# Patient Record
Sex: FEMALE | Race: WHITE | Employment: OTHER | URBAN - METROPOLITAN AREA
[De-identification: names, ages, dates, MRNs, and addresses within clinical notes are randomized per-mention and may not be internally consistent; named-entity substitution may affect disease eponyms.]

---

## 2017-05-11 ENCOUNTER — APPOINTMENT (OUTPATIENT)
Dept: GENERAL RADIOLOGY | Age: 77
End: 2017-05-11
Attending: NURSE PRACTITIONER

## 2017-05-11 ENCOUNTER — HOSPITAL ENCOUNTER (OUTPATIENT)
Age: 77
Discharge: HOME | End: 2017-05-22
Attending: INTERNAL MEDICINE | Admitting: INTERNAL MEDICINE

## 2017-05-11 PROCEDURE — 87106 FUNGI IDENTIFICATION YEAST: CPT | Performed by: NURSE PRACTITIONER

## 2017-05-11 PROCEDURE — 87070 CULTURE OTHR SPECIMN AEROBIC: CPT | Performed by: NURSE PRACTITIONER

## 2017-05-11 PROCEDURE — 71010 XR CHEST PORT: CPT

## 2017-05-12 LAB
ALBUMIN SERPL BCP-MCNC: 1.9 G/DL (ref 3.2–4.6)
ALBUMIN/GLOB SERPL: 0.5 {RATIO} (ref 1.2–3.5)
ALP SERPL-CCNC: 80 U/L (ref 50–136)
ALT SERPL-CCNC: 17 U/L (ref 12–65)
ANION GAP BLD CALC-SCNC: 6 MMOL/L (ref 7–16)
AST SERPL W P-5'-P-CCNC: 19 U/L (ref 15–37)
BASOPHILS # BLD AUTO: 0 K/UL (ref 0–0.2)
BASOPHILS # BLD: 0 % (ref 0–2)
BILIRUB SERPL-MCNC: 0.2 MG/DL (ref 0.2–1.1)
BUN SERPL-MCNC: 22 MG/DL (ref 8–23)
CALCIUM SERPL-MCNC: 7.9 MG/DL (ref 8.3–10.4)
CHLORIDE SERPL-SCNC: 101 MMOL/L (ref 98–107)
CO2 SERPL-SCNC: 35 MMOL/L (ref 21–32)
CREAT SERPL-MCNC: 0.45 MG/DL (ref 0.6–1)
DIFFERENTIAL METHOD BLD: ABNORMAL
EOSINOPHIL # BLD: 0 K/UL (ref 0–0.8)
EOSINOPHIL NFR BLD: 0 % (ref 0.5–7.8)
ERYTHROCYTE [DISTWIDTH] IN BLOOD BY AUTOMATED COUNT: 15.5 % (ref 11.9–14.6)
GLOBULIN SER CALC-MCNC: 4.1 G/DL (ref 2.3–3.5)
GLUCOSE SERPL-MCNC: 104 MG/DL (ref 65–100)
HCT VFR BLD AUTO: 37.6 % (ref 35.8–46.3)
HGB BLD-MCNC: 12 G/DL (ref 11.7–15.4)
IMM GRANULOCYTES # BLD: 0.1 K/UL (ref 0–0.5)
IMM GRANULOCYTES NFR BLD AUTO: 0.3 % (ref 0–5)
LYMPHOCYTES # BLD AUTO: 2 % (ref 13–44)
LYMPHOCYTES # BLD: 0.4 K/UL (ref 0.5–4.6)
MAGNESIUM SERPL-MCNC: 1.9 MG/DL (ref 1.8–2.4)
MCH RBC QN AUTO: 26.3 PG (ref 26.1–32.9)
MCHC RBC AUTO-ENTMCNC: 31.9 G/DL (ref 31.4–35)
MCV RBC AUTO: 82.3 FL (ref 79.6–97.8)
MONOCYTES # BLD: 0.6 K/UL (ref 0.1–1.3)
MONOCYTES NFR BLD AUTO: 3 % (ref 4–12)
NEUTS SEG # BLD: 18.7 K/UL (ref 1.7–8.2)
NEUTS SEG NFR BLD AUTO: 95 % (ref 43–78)
PHOSPHATE SERPL-MCNC: 2.9 MG/DL (ref 2.3–3.7)
PLATELET # BLD AUTO: 283 K/UL (ref 150–450)
PMV BLD AUTO: 11.3 FL (ref 10.8–14.1)
POTASSIUM SERPL-SCNC: 3.6 MMOL/L (ref 3.5–5.1)
PROT SERPL-MCNC: 6 G/DL (ref 6.3–8.2)
RBC # BLD AUTO: 4.57 M/UL (ref 4.05–5.25)
SODIUM SERPL-SCNC: 142 MMOL/L (ref 136–145)
WBC # BLD AUTO: 19.9 K/UL (ref 4.3–11.1)

## 2017-05-12 PROCEDURE — 84100 ASSAY OF PHOSPHORUS: CPT | Performed by: NURSE PRACTITIONER

## 2017-05-12 PROCEDURE — 83735 ASSAY OF MAGNESIUM: CPT | Performed by: NURSE PRACTITIONER

## 2017-05-12 PROCEDURE — 80053 COMPREHEN METABOLIC PANEL: CPT | Performed by: NURSE PRACTITIONER

## 2017-05-12 PROCEDURE — 85025 COMPLETE CBC W/AUTO DIFF WBC: CPT | Performed by: NURSE PRACTITIONER

## 2017-05-15 LAB
ANION GAP BLD CALC-SCNC: 8 MMOL/L (ref 7–16)
BACTERIA SPEC CULT: ABNORMAL
BACTERIA SPEC CULT: ABNORMAL
BASOPHILS # BLD AUTO: 0 K/UL (ref 0–0.2)
BASOPHILS # BLD: 0 % (ref 0–2)
BNP SERPL-MCNC: 203 PG/ML
BUN SERPL-MCNC: 19 MG/DL (ref 8–23)
CALCIUM SERPL-MCNC: 8.4 MG/DL (ref 8.3–10.4)
CHLORIDE SERPL-SCNC: 95 MMOL/L (ref 98–107)
CO2 SERPL-SCNC: 35 MMOL/L (ref 21–32)
CREAT SERPL-MCNC: 0.31 MG/DL (ref 0.6–1)
DIFFERENTIAL METHOD BLD: ABNORMAL
EOSINOPHIL # BLD: 0 K/UL (ref 0–0.8)
EOSINOPHIL NFR BLD: 0 % (ref 0.5–7.8)
ERYTHROCYTE [DISTWIDTH] IN BLOOD BY AUTOMATED COUNT: 15.4 % (ref 11.9–14.6)
GLUCOSE SERPL-MCNC: 80 MG/DL (ref 65–100)
GRAM STN SPEC: ABNORMAL
HCT VFR BLD AUTO: 43.8 % (ref 35.8–46.3)
HGB BLD-MCNC: 13.7 G/DL (ref 11.7–15.4)
IMM GRANULOCYTES # BLD: 0 K/UL (ref 0–0.5)
IMM GRANULOCYTES NFR BLD AUTO: 0.3 % (ref 0–5)
LYMPHOCYTES # BLD AUTO: 9 % (ref 13–44)
LYMPHOCYTES # BLD: 1.1 K/UL (ref 0.5–4.6)
MAGNESIUM SERPL-MCNC: 2 MG/DL (ref 1.8–2.4)
MCH RBC QN AUTO: 25.9 PG (ref 26.1–32.9)
MCHC RBC AUTO-ENTMCNC: 31.3 G/DL (ref 31.4–35)
MCV RBC AUTO: 83 FL (ref 79.6–97.8)
MONOCYTES # BLD: 0.8 K/UL (ref 0.1–1.3)
MONOCYTES NFR BLD AUTO: 7 % (ref 4–12)
NEUTS SEG # BLD: 9.7 K/UL (ref 1.7–8.2)
NEUTS SEG NFR BLD AUTO: 84 % (ref 43–78)
PLATELET # BLD AUTO: 356 K/UL (ref 150–450)
PMV BLD AUTO: 11.2 FL (ref 10.8–14.1)
POTASSIUM SERPL-SCNC: 4.3 MMOL/L (ref 3.5–5.1)
RBC # BLD AUTO: 5.28 M/UL (ref 4.05–5.25)
SERVICE CMNT-IMP: ABNORMAL
SODIUM SERPL-SCNC: 138 MMOL/L (ref 136–145)
WBC # BLD AUTO: 11.6 K/UL (ref 4.3–11.1)

## 2017-05-15 PROCEDURE — 83880 ASSAY OF NATRIURETIC PEPTIDE: CPT | Performed by: NURSE PRACTITIONER

## 2017-05-15 PROCEDURE — 85025 COMPLETE CBC W/AUTO DIFF WBC: CPT | Performed by: NURSE PRACTITIONER

## 2017-05-15 PROCEDURE — 80048 BASIC METABOLIC PNL TOTAL CA: CPT | Performed by: NURSE PRACTITIONER

## 2017-05-15 PROCEDURE — 83735 ASSAY OF MAGNESIUM: CPT | Performed by: NURSE PRACTITIONER

## 2017-05-16 ENCOUNTER — APPOINTMENT (OUTPATIENT)
Dept: GENERAL RADIOLOGY | Age: 77
End: 2017-05-16
Attending: NURSE PRACTITIONER

## 2017-05-16 PROCEDURE — 71010 XR CHEST PORT: CPT

## 2018-01-01 ENCOUNTER — APPOINTMENT (OUTPATIENT)
Dept: CT IMAGING | Age: 78
DRG: 871 | End: 2018-01-01
Attending: INTERNAL MEDICINE
Payer: MEDICARE

## 2018-01-01 ENCOUNTER — APPOINTMENT (OUTPATIENT)
Dept: GENERAL RADIOLOGY | Age: 78
DRG: 871 | End: 2018-01-01
Attending: EMERGENCY MEDICINE
Payer: MEDICARE

## 2018-01-01 ENCOUNTER — APPOINTMENT (OUTPATIENT)
Dept: GENERAL RADIOLOGY | Age: 78
DRG: 871 | End: 2018-01-01
Attending: INTERNAL MEDICINE
Payer: MEDICARE

## 2018-01-01 ENCOUNTER — HOSPITAL ENCOUNTER (INPATIENT)
Age: 78
LOS: 1 days | DRG: 871 | End: 2018-09-30
Attending: EMERGENCY MEDICINE | Admitting: INTERNAL MEDICINE
Payer: MEDICARE

## 2018-01-01 VITALS
OXYGEN SATURATION: 58 % | BODY MASS INDEX: 15.39 KG/M2 | WEIGHT: 90.17 LBS | SYSTOLIC BLOOD PRESSURE: 47 MMHG | TEMPERATURE: 96.3 F | DIASTOLIC BLOOD PRESSURE: 30 MMHG | HEIGHT: 64 IN

## 2018-01-01 DIAGNOSIS — R65.21 SEPTIC SHOCK (HCC): ICD-10-CM

## 2018-01-01 DIAGNOSIS — N30.00 ACUTE CYSTITIS WITHOUT HEMATURIA: ICD-10-CM

## 2018-01-01 DIAGNOSIS — A41.9 SEPTIC SHOCK (HCC): ICD-10-CM

## 2018-01-01 DIAGNOSIS — Z66 DNR (DO NOT RESUSCITATE): ICD-10-CM

## 2018-01-01 DIAGNOSIS — K72.00 SHOCK LIVER: ICD-10-CM

## 2018-01-01 DIAGNOSIS — R63.4 WEIGHT LOSS: ICD-10-CM

## 2018-01-01 DIAGNOSIS — E87.1 HYPONATREMIA: ICD-10-CM

## 2018-01-01 DIAGNOSIS — J96.01 ACUTE RESPIRATORY FAILURE WITH HYPOXIA (HCC): ICD-10-CM

## 2018-01-01 DIAGNOSIS — E87.20 METABOLIC ACIDOSIS: ICD-10-CM

## 2018-01-01 DIAGNOSIS — J18.9 COMMUNITY ACQUIRED PNEUMONIA OF RIGHT LOWER LOBE OF LUNG: ICD-10-CM

## 2018-01-01 DIAGNOSIS — D72.829 LEUKOCYTOSIS, UNSPECIFIED TYPE: ICD-10-CM

## 2018-01-01 DIAGNOSIS — N17.9 ACUTE RENAL FAILURE, UNSPECIFIED ACUTE RENAL FAILURE TYPE (HCC): ICD-10-CM

## 2018-01-01 DIAGNOSIS — J43.8 OTHER EMPHYSEMA (HCC): ICD-10-CM

## 2018-01-01 DIAGNOSIS — J96.01 ACUTE RESPIRATORY FAILURE WITH HYPOXEMIA (HCC): ICD-10-CM

## 2018-01-01 LAB
ALBUMIN SERPL-MCNC: 1.7 G/DL (ref 3.2–4.6)
ALBUMIN SERPL-MCNC: 2.2 G/DL (ref 3.2–4.6)
ALBUMIN/GLOB SERPL: 0.4 {RATIO} (ref 1.2–3.5)
ALBUMIN/GLOB SERPL: 0.5 {RATIO} (ref 1.2–3.5)
ALP SERPL-CCNC: 120 U/L (ref 50–136)
ALP SERPL-CCNC: 148 U/L (ref 50–136)
ALT SERPL-CCNC: 1046 U/L (ref 12–65)
ALT SERPL-CCNC: 856 U/L (ref 12–65)
AMORPH CRY URNS QL MICRO: ABNORMAL
ANION GAP SERPL CALC-SCNC: 18 MMOL/L (ref 7–16)
ANION GAP SERPL CALC-SCNC: 21 MMOL/L (ref 7–16)
ANION GAP SERPL CALC-SCNC: 28 MMOL/L (ref 7–16)
APPEARANCE UR: ABNORMAL
ARTERIAL PATENCY WRIST A: ABNORMAL
ARTERIAL PATENCY WRIST A: POSITIVE
ARTERIAL PATENCY WRIST A: POSITIVE
AST SERPL-CCNC: 2780 U/L (ref 15–37)
AST SERPL-CCNC: 4328 U/L (ref 15–37)
ATRIAL RATE: 170 BPM
BACTERIA URNS QL MICRO: ABNORMAL /HPF
BASE DEFICIT BLDA-SCNC: 16.8 MMOL/L (ref 0–2)
BASE DEFICIT BLDA-SCNC: 17.9 MMOL/L (ref 0–2)
BASE DEFICIT BLDA-SCNC: 19.5 MMOL/L (ref 0–2)
BASE DEFICIT BLDA-SCNC: 19.5 MMOL/L (ref 0–2)
BASE DEFICIT BLDA-SCNC: 20.6 MMOL/L (ref 0–2)
BASE DEFICIT BLDA-SCNC: 21.7 MMOL/L (ref 0–2)
BASOPHILS # BLD: 0 K/UL (ref 0–0.2)
BASOPHILS # BLD: 0.1 K/UL (ref 0–0.2)
BASOPHILS NFR BLD: 0 % (ref 0–2)
BASOPHILS NFR BLD: 0 % (ref 0–2)
BDY SITE: ABNORMAL
BILIRUB SERPL-MCNC: 1.1 MG/DL (ref 0.2–1.1)
BILIRUB SERPL-MCNC: 1.4 MG/DL (ref 0.2–1.1)
BILIRUB UR QL: ABNORMAL
BUN SERPL-MCNC: 45 MG/DL (ref 8–23)
BUN SERPL-MCNC: 46 MG/DL (ref 8–23)
BUN SERPL-MCNC: 51 MG/DL (ref 8–23)
CALCIUM SERPL-MCNC: 6.1 MG/DL (ref 8.3–10.4)
CALCIUM SERPL-MCNC: 7.5 MG/DL (ref 8.3–10.4)
CALCIUM SERPL-MCNC: 8.1 MG/DL (ref 8.3–10.4)
CALCULATED P AXIS, ECG09: 88 DEGREES
CALCULATED R AXIS, ECG10: 86 DEGREES
CALCULATED T AXIS, ECG11: 73 DEGREES
CASTS URNS QL MICRO: ABNORMAL /LPF
CHLORIDE SERPL-SCNC: 101 MMOL/L (ref 98–107)
CHLORIDE SERPL-SCNC: 94 MMOL/L (ref 98–107)
CHLORIDE SERPL-SCNC: 95 MMOL/L (ref 98–107)
CK MB CFR SERPL CALC: 1.7 %
CK MB SERPL-MCNC: 8.5 NG/ML (ref 0.5–3.6)
CK SERPL-CCNC: 505 U/L (ref 21–215)
CO2 SERPL-SCNC: 15 MMOL/L (ref 21–32)
CO2 SERPL-SCNC: 16 MMOL/L (ref 21–32)
CO2 SERPL-SCNC: 20 MMOL/L (ref 21–32)
COHGB MFR BLD: 0.3 % (ref 0.5–1.5)
COHGB MFR BLD: 0.4 % (ref 0.5–1.5)
COHGB MFR BLD: 0.6 % (ref 0.5–1.5)
COHGB MFR BLD: 0.8 % (ref 0.5–1.5)
COLOR UR: ABNORMAL
CREAT SERPL-MCNC: 1.66 MG/DL (ref 0.6–1)
CREAT SERPL-MCNC: 1.66 MG/DL (ref 0.6–1)
CREAT SERPL-MCNC: 2.16 MG/DL (ref 0.6–1)
DIAGNOSIS, 93000: NORMAL
DIFFERENTIAL METHOD BLD: ABNORMAL
DO-HGB BLD-MCNC: 2 % (ref 0–5)
DO-HGB BLD-MCNC: 4 % (ref 0–5)
DO-HGB BLD-MCNC: 4 % (ref 0–5)
DO-HGB BLD-MCNC: 5 % (ref 0–5)
DO-HGB BLD-MCNC: 6 % (ref 0–5)
DO-HGB BLD-MCNC: 6 % (ref 0–5)
EOSINOPHIL # BLD: 0 K/UL (ref 0–0.8)
EOSINOPHIL # BLD: 0 K/UL (ref 0–0.8)
EOSINOPHIL NFR BLD: 0 % (ref 0.5–7.8)
EOSINOPHIL NFR BLD: 0 % (ref 0.5–7.8)
EPI CELLS #/AREA URNS HPF: ABNORMAL /HPF
ERYTHROCYTE [DISTWIDTH] IN BLOOD BY AUTOMATED COUNT: 15.5 %
ERYTHROCYTE [DISTWIDTH] IN BLOOD BY AUTOMATED COUNT: 15.8 %
ERYTHROCYTE [DISTWIDTH] IN BLOOD BY AUTOMATED COUNT: 16.9 %
FIO2 ON VENT: 50 %
FIO2 ON VENT: 60 %
GLOBULIN SER CALC-MCNC: 4.1 G/DL (ref 2.3–3.5)
GLOBULIN SER CALC-MCNC: 4.7 G/DL (ref 2.3–3.5)
GLUCOSE BLD STRIP.AUTO-MCNC: 154 MG/DL (ref 65–100)
GLUCOSE BLD STRIP.AUTO-MCNC: 154 MG/DL (ref 65–100)
GLUCOSE BLD STRIP.AUTO-MCNC: 171 MG/DL (ref 65–100)
GLUCOSE BLD STRIP.AUTO-MCNC: 216 MG/DL (ref 65–100)
GLUCOSE SERPL-MCNC: 143 MG/DL (ref 65–100)
GLUCOSE SERPL-MCNC: 202 MG/DL (ref 65–100)
GLUCOSE SERPL-MCNC: 270 MG/DL (ref 65–100)
GLUCOSE UR STRIP.AUTO-MCNC: NEGATIVE MG/DL
HCO3 BLDA-SCNC: 10 MMOL/L (ref 22–26)
HCO3 BLDA-SCNC: 11 MMOL/L (ref 22–26)
HCO3 BLDA-SCNC: 12 MMOL/L (ref 22–26)
HCT VFR BLD AUTO: 32.4 % (ref 35.8–46.3)
HCT VFR BLD AUTO: 39.7 % (ref 35.8–46.3)
HCT VFR BLD AUTO: 41.5 % (ref 35.8–46.3)
HGB BLD-MCNC: 12 G/DL (ref 11.7–15.4)
HGB BLD-MCNC: 12.1 G/DL (ref 11.7–15.4)
HGB BLD-MCNC: 9.1 G/DL (ref 11.7–15.4)
HGB BLDMV-MCNC: 11 GM/DL (ref 11.7–15)
HGB BLDMV-MCNC: 11.7 GM/DL (ref 11.7–15)
HGB BLDMV-MCNC: 12.6 GM/DL (ref 11.7–15)
HGB BLDMV-MCNC: 13 GM/DL (ref 11.7–15)
HGB BLDMV-MCNC: 8.2 GM/DL (ref 11.7–15)
HGB BLDMV-MCNC: 9.7 GM/DL (ref 11.7–15)
HGB UR QL STRIP: ABNORMAL
IMM GRANULOCYTES # BLD: 0.3 K/UL (ref 0–0.5)
IMM GRANULOCYTES # BLD: 1.1 K/UL (ref 0–0.5)
IMM GRANULOCYTES NFR BLD AUTO: 1 % (ref 0–5)
IMM GRANULOCYTES NFR BLD AUTO: 3 % (ref 0–5)
INR PPP: 10.5
KETONES UR QL STRIP.AUTO: ABNORMAL MG/DL
LACTATE BLD-SCNC: 8.9 MMOL/L (ref 0.5–1.9)
LACTATE SERPL-SCNC: 19.1 MMOL/L (ref 0.4–2)
LEUKOCYTE ESTERASE UR QL STRIP.AUTO: ABNORMAL
LYMPHOCYTES # BLD: 0.6 K/UL (ref 0.5–4.6)
LYMPHOCYTES # BLD: 0.8 K/UL (ref 0.5–4.6)
LYMPHOCYTES # BLD: 1.5 K/UL (ref 0.5–4.6)
LYMPHOCYTES NFR BLD MANUAL: 4 % (ref 16–44)
LYMPHOCYTES NFR BLD: 2 % (ref 13–44)
LYMPHOCYTES NFR BLD: 2 % (ref 13–44)
MCH RBC QN AUTO: 26.8 PG (ref 26.1–32.9)
MCH RBC QN AUTO: 27.2 PG (ref 26.1–32.9)
MCH RBC QN AUTO: 27.6 PG (ref 26.1–32.9)
MCHC RBC AUTO-ENTMCNC: 28.1 G/DL (ref 31.4–35)
MCHC RBC AUTO-ENTMCNC: 29.2 G/DL (ref 31.4–35)
MCHC RBC AUTO-ENTMCNC: 30.2 G/DL (ref 31.4–35)
MCV RBC AUTO: 88.6 FL (ref 79.6–97.8)
MCV RBC AUTO: 94.5 FL (ref 79.6–97.8)
MCV RBC AUTO: 96.7 FL (ref 79.6–97.8)
METAMYELOCYTES NFR BLD MANUAL: 1 %
METHGB MFR BLD: 0.5 % (ref 0–1.5)
METHGB MFR BLD: 0.7 % (ref 0–1.5)
METHGB MFR BLD: 0.7 % (ref 0–1.5)
METHGB MFR BLD: 0.8 % (ref 0–1.5)
METHGB MFR BLD: 0.8 % (ref 0–1.5)
METHGB MFR BLD: 0.9 % (ref 0–1.5)
MONOCYTES # BLD: 1.1 K/UL (ref 0.1–1.3)
MONOCYTES # BLD: 1.5 K/UL (ref 0.1–1.3)
MONOCYTES # BLD: 1.7 K/UL (ref 0.1–1.3)
MONOCYTES NFR BLD MANUAL: 4 % (ref 3–9)
MONOCYTES NFR BLD: 3 % (ref 4–12)
MONOCYTES NFR BLD: 6 % (ref 4–12)
NEUTS BAND NFR BLD MANUAL: 7 % (ref 0–10)
NEUTS SEG # BLD: 26.3 K/UL (ref 1.7–8.2)
NEUTS SEG # BLD: 31.5 K/UL (ref 1.7–8.2)
NEUTS SEG # BLD: 34.2 K/UL (ref 1.7–8.2)
NEUTS SEG NFR BLD MANUAL: 84 % (ref 47–75)
NEUTS SEG NFR BLD: 91 % (ref 43–78)
NEUTS SEG NFR BLD: 91 % (ref 43–78)
NITRITE UR QL STRIP.AUTO: NEGATIVE
NRBC # BLD: 0.02 K/UL (ref 0–0.2)
NRBC # BLD: 0.05 K/UL (ref 0–0.2)
NRBC # BLD: 0.25 K/UL (ref 0–0.2)
NRBC BLD-RTO: 2 PER 100 WBC
OTHER OBSERVATIONS,UCOM: ABNORMAL
OXYHGB MFR BLDA: 92.4 % (ref 94–97)
OXYHGB MFR BLDA: 93.2 % (ref 94–97)
OXYHGB MFR BLDA: 93.9 % (ref 94–97)
OXYHGB MFR BLDA: 95.2 % (ref 94–97)
OXYHGB MFR BLDA: 95.3 % (ref 94–97)
OXYHGB MFR BLDA: 97 % (ref 94–97)
PCO2 BLDA: 30 MMHG (ref 35–45)
PCO2 BLDA: 34 MMHG (ref 35–45)
PCO2 BLDA: 38 MMHG (ref 35–45)
PCO2 BLDA: 40 MMHG (ref 35–45)
PCO2 BLDA: 40 MMHG (ref 35–45)
PCO2 BLDA: 46 MMHG (ref 35–45)
PEEP RESPIRATORY: 8 CM[H2O]
PH BLDA: 6.95 [PH] (ref 7.35–7.45)
PH BLDA: 7.01 [PH] (ref 7.35–7.45)
PH BLDA: 7.03 [PH] (ref 7.35–7.45)
PH BLDA: 7.07 [PH] (ref 7.35–7.45)
PH BLDA: 7.07 [PH] (ref 7.35–7.45)
PH BLDA: 7.16 [PH] (ref 7.35–7.45)
PH UR STRIP: 5 [PH] (ref 5–9)
PLATELET # BLD AUTO: 150 K/UL (ref 150–450)
PLATELET # BLD AUTO: 238 K/UL (ref 150–450)
PLATELET # BLD AUTO: 42 K/UL (ref 150–450)
PLATELET COMMENTS,PCOM: ABNORMAL
PMV BLD AUTO: 10.3 FL (ref 9.4–12.3)
PMV BLD AUTO: 11.2 FL (ref 9.4–12.3)
PMV BLD AUTO: 11.7 FL (ref 9.4–12.3)
PO2 BLDA: 105 MMHG (ref 80–105)
PO2 BLDA: 111 MMHG (ref 80–105)
PO2 BLDA: 136 MMHG (ref 80–105)
PO2 BLDA: 136 MMHG (ref 80–105)
PO2 BLDA: 92 MMHG (ref 80–105)
PO2 BLDA: 94 MMHG (ref 80–105)
POTASSIUM SERPL-SCNC: 4.7 MMOL/L (ref 3.5–5.1)
POTASSIUM SERPL-SCNC: 4.9 MMOL/L (ref 3.5–5.1)
POTASSIUM SERPL-SCNC: 5.4 MMOL/L (ref 3.5–5.1)
PROCALCITONIN SERPL-MCNC: 1.6 NG/ML
PROT SERPL-MCNC: 5.8 G/DL (ref 6.3–8.2)
PROT SERPL-MCNC: 6.9 G/DL (ref 6.3–8.2)
PROT UR STRIP-MCNC: 100 MG/DL
PROTHROMBIN TIME: 78.3 SEC (ref 11.5–14.5)
Q-T INTERVAL, ECG07: 300 MS
QRS DURATION, ECG06: 72 MS
QTC CALCULATION (BEZET), ECG08: 482 MS
RBC # BLD AUTO: 3.35 M/UL (ref 4.05–5.2)
RBC # BLD AUTO: 4.39 M/UL (ref 4.05–5.2)
RBC # BLD AUTO: 4.48 M/UL (ref 4.05–5.2)
RBC #/AREA URNS HPF: ABNORMAL /HPF
RBC MORPH BLD: ABNORMAL
RESP RATE: 15
RESP RATE: 24
RESP RATE: 24
RESP RATE: 30
SAO2 % BLD: 94 % (ref 92–98.5)
SAO2 % BLD: 94 % (ref 92–98.5)
SAO2 % BLD: 95 % (ref 92–98.5)
SAO2 % BLD: 96 % (ref 92–98.5)
SAO2 % BLD: 97 % (ref 92–98.5)
SAO2 % BLD: 98 % (ref 92–98.5)
SERVICE CMNT-IMP: ABNORMAL
SODIUM SERPL-SCNC: 133 MMOL/L (ref 136–145)
SODIUM SERPL-SCNC: 137 MMOL/L (ref 136–145)
SODIUM SERPL-SCNC: 138 MMOL/L (ref 136–145)
SP GR UR REFRACTOMETRY: 1.02 (ref 1–1.02)
TROPONIN I BLD-MCNC: 0.35 NG/ML (ref 0.02–0.05)
TROPONIN I SERPL-MCNC: 1.93 NG/ML (ref 0.02–0.05)
UROBILINOGEN UR QL STRIP.AUTO: 2 EU/DL (ref 0.2–1)
VENTILATION MODE VENT: ABNORMAL
VENTRICULAR RATE, ECG03: 155 BPM
VT SETTING VENT: 400 ML
VT SETTING VENT: 480 ML
VT SETTING VENT: 480 ML
WBC # BLD AUTO: 28.9 K/UL (ref 4.3–11.1)
WBC # BLD AUTO: 34.7 K/UL (ref 4.3–11.1)
WBC # BLD AUTO: 37.2 K/UL (ref 4.3–11.1)
WBC MORPH BLD: ABNORMAL
WBC URNS QL MICRO: ABNORMAL /HPF

## 2018-01-01 PROCEDURE — 65610000001 HC ROOM ICU GENERAL

## 2018-01-01 PROCEDURE — 74011250636 HC RX REV CODE- 250/636: Performed by: EMERGENCY MEDICINE

## 2018-01-01 PROCEDURE — 36556 INSERT NON-TUNNEL CV CATH: CPT

## 2018-01-01 PROCEDURE — 94640 AIRWAY INHALATION TREATMENT: CPT

## 2018-01-01 PROCEDURE — 74011000250 HC RX REV CODE- 250: Performed by: INTERNAL MEDICINE

## 2018-01-01 PROCEDURE — 74011250636 HC RX REV CODE- 250/636: Performed by: INTERNAL MEDICINE

## 2018-01-01 PROCEDURE — 71045 X-RAY EXAM CHEST 1 VIEW: CPT

## 2018-01-01 PROCEDURE — 82803 BLOOD GASES ANY COMBINATION: CPT

## 2018-01-01 PROCEDURE — 87185 SC STD ENZYME DETCJ PER NZM: CPT

## 2018-01-01 PROCEDURE — 96365 THER/PROPH/DIAG IV INF INIT: CPT | Performed by: EMERGENCY MEDICINE

## 2018-01-01 PROCEDURE — 0T9B70Z DRAINAGE OF BLADDER WITH DRAINAGE DEVICE, VIA NATURAL OR ARTIFICIAL OPENING: ICD-10-PCS | Performed by: INTERNAL MEDICINE

## 2018-01-01 PROCEDURE — 94002 VENT MGMT INPAT INIT DAY: CPT

## 2018-01-01 PROCEDURE — 84484 ASSAY OF TROPONIN QUANT: CPT

## 2018-01-01 PROCEDURE — 83605 ASSAY OF LACTIC ACID: CPT

## 2018-01-01 PROCEDURE — 85025 COMPLETE CBC W/AUTO DIFF WBC: CPT

## 2018-01-01 PROCEDURE — 77030020263 HC SOL INJ SOD CL0.9% LFCR 1000ML

## 2018-01-01 PROCEDURE — C1751 CATH, INF, PER/CENT/MIDLINE: HCPCS

## 2018-01-01 PROCEDURE — 93005 ELECTROCARDIOGRAM TRACING: CPT | Performed by: EMERGENCY MEDICINE

## 2018-01-01 PROCEDURE — 74011000258 HC RX REV CODE- 258: Performed by: INTERNAL MEDICINE

## 2018-01-01 PROCEDURE — 99223 1ST HOSP IP/OBS HIGH 75: CPT | Performed by: INTERNAL MEDICINE

## 2018-01-01 PROCEDURE — 82962 GLUCOSE BLOOD TEST: CPT

## 2018-01-01 PROCEDURE — 36600 WITHDRAWAL OF ARTERIAL BLOOD: CPT

## 2018-01-01 PROCEDURE — 96375 TX/PRO/DX INJ NEW DRUG ADDON: CPT | Performed by: EMERGENCY MEDICINE

## 2018-01-01 PROCEDURE — P9047 ALBUMIN (HUMAN), 25%, 50ML: HCPCS | Performed by: INTERNAL MEDICINE

## 2018-01-01 PROCEDURE — 31500 INSERT EMERGENCY AIRWAY: CPT

## 2018-01-01 PROCEDURE — 81001 URINALYSIS AUTO W/SCOPE: CPT

## 2018-01-01 PROCEDURE — B546ZZA ULTRASONOGRAPHY OF RIGHT SUBCLAVIAN VEIN, GUIDANCE: ICD-10-PCS | Performed by: INTERNAL MEDICINE

## 2018-01-01 PROCEDURE — 74011636637 HC RX REV CODE- 636/637: Performed by: INTERNAL MEDICINE

## 2018-01-01 PROCEDURE — 77030005518 HC CATH URETH FOL 2W BARD -B

## 2018-01-01 PROCEDURE — 99285 EMERGENCY DEPT VISIT HI MDM: CPT | Performed by: EMERGENCY MEDICINE

## 2018-01-01 PROCEDURE — 85610 PROTHROMBIN TIME: CPT

## 2018-01-01 PROCEDURE — 74011000250 HC RX REV CODE- 250: Performed by: EMERGENCY MEDICINE

## 2018-01-01 PROCEDURE — 80053 COMPREHEN METABOLIC PANEL: CPT

## 2018-01-01 PROCEDURE — C9113 INJ PANTOPRAZOLE SODIUM, VIA: HCPCS | Performed by: INTERNAL MEDICINE

## 2018-01-01 PROCEDURE — 82550 ASSAY OF CK (CPK): CPT

## 2018-01-01 PROCEDURE — 36556 INSERT NON-TUNNEL CV CATH: CPT | Performed by: INTERNAL MEDICINE

## 2018-01-01 PROCEDURE — 87040 BLOOD CULTURE FOR BACTERIA: CPT

## 2018-01-01 PROCEDURE — 0BH17EZ INSERTION OF ENDOTRACHEAL AIRWAY INTO TRACHEA, VIA NATURAL OR ARTIFICIAL OPENING: ICD-10-PCS | Performed by: EMERGENCY MEDICINE

## 2018-01-01 PROCEDURE — 77030013794 HC KT TRNSDUC BLD EDWD -B

## 2018-01-01 PROCEDURE — 96367 TX/PROPH/DG ADDL SEQ IV INF: CPT | Performed by: EMERGENCY MEDICINE

## 2018-01-01 PROCEDURE — 77030008771 HC TU NG SALEM SUMP -A

## 2018-01-01 PROCEDURE — 05H533Z INSERTION OF INFUSION DEVICE INTO RIGHT SUBCLAVIAN VEIN, PERCUTANEOUS APPROACH: ICD-10-PCS | Performed by: INTERNAL MEDICINE

## 2018-01-01 PROCEDURE — 51702 INSERT TEMP BLADDER CATH: CPT | Performed by: EMERGENCY MEDICINE

## 2018-01-01 PROCEDURE — 99291 CRITICAL CARE FIRST HOUR: CPT | Performed by: INTERNAL MEDICINE

## 2018-01-01 PROCEDURE — 70450 CT HEAD/BRAIN W/O DYE: CPT

## 2018-01-01 PROCEDURE — 36415 COLL VENOUS BLD VENIPUNCTURE: CPT

## 2018-01-01 PROCEDURE — 77030019605

## 2018-01-01 PROCEDURE — 5A1945Z RESPIRATORY VENTILATION, 24-96 CONSECUTIVE HOURS: ICD-10-PCS | Performed by: EMERGENCY MEDICINE

## 2018-01-01 PROCEDURE — 84145 PROCALCITONIN (PCT): CPT

## 2018-01-01 PROCEDURE — 87070 CULTURE OTHR SPECIMN AEROBIC: CPT

## 2018-01-01 PROCEDURE — 74011000258 HC RX REV CODE- 258: Performed by: EMERGENCY MEDICINE

## 2018-01-01 RX ORDER — PHENYLEPHRINE 40 MG/250 ML (160 MCG/ML) IN 0.9 % SODIUM CHLORIDE IV
10-100 SOLUTION
Status: DISCONTINUED | OUTPATIENT
Start: 2018-01-01 | End: 2018-01-01 | Stop reason: SDUPTHER

## 2018-01-01 RX ORDER — SODIUM BICARBONATE 1 MEQ/ML
50 SYRINGE (ML) INTRAVENOUS
Status: DISCONTINUED | OUTPATIENT
Start: 2018-01-01 | End: 2018-01-01 | Stop reason: RX

## 2018-01-01 RX ORDER — SODIUM CHLORIDE 0.9 % (FLUSH) 0.9 %
5-10 SYRINGE (ML) INJECTION EVERY 8 HOURS
Status: DISCONTINUED | OUTPATIENT
Start: 2018-01-01 | End: 2018-10-01 | Stop reason: HOSPADM

## 2018-01-01 RX ORDER — HYDROCORTISONE SODIUM SUCCINATE 100 MG/2ML
50 INJECTION, POWDER, FOR SOLUTION INTRAMUSCULAR; INTRAVENOUS EVERY 8 HOURS
Status: DISCONTINUED | OUTPATIENT
Start: 2018-01-01 | End: 2018-01-01

## 2018-01-01 RX ORDER — SODIUM CHLORIDE 0.9 % (FLUSH) 0.9 %
5-10 SYRINGE (ML) INJECTION AS NEEDED
Status: DISCONTINUED | OUTPATIENT
Start: 2018-01-01 | End: 2018-10-01 | Stop reason: HOSPADM

## 2018-01-01 RX ORDER — ALBUTEROL SULFATE 0.83 MG/ML
2.5 SOLUTION RESPIRATORY (INHALATION)
Status: DISCONTINUED | OUTPATIENT
Start: 2018-01-01 | End: 2018-10-01 | Stop reason: HOSPADM

## 2018-01-01 RX ORDER — GUAIFENESIN 100 MG/5ML
81 LIQUID (ML) ORAL DAILY
COMMUNITY

## 2018-01-01 RX ORDER — NOREPINEPHRINE BIT/0.9 % NACL 4MG/250ML
2-20 PLASTIC BAG, INJECTION (ML) INTRAVENOUS
Status: DISCONTINUED | OUTPATIENT
Start: 2018-01-01 | End: 2018-10-01 | Stop reason: HOSPADM

## 2018-01-01 RX ORDER — ALBUMIN HUMAN 250 G/1000ML
25 SOLUTION INTRAVENOUS
Status: COMPLETED | OUTPATIENT
Start: 2018-01-01 | End: 2018-01-01

## 2018-01-01 RX ORDER — SODIUM CHLORIDE 9 MG/ML
50 INJECTION, SOLUTION INTRAVENOUS CONTINUOUS
Status: DISCONTINUED | OUTPATIENT
Start: 2018-01-01 | End: 2018-01-01

## 2018-01-01 RX ORDER — ASPIRIN 325 MG
325 TABLET ORAL DAILY
Status: DISCONTINUED | OUTPATIENT
Start: 2018-10-01 | End: 2018-10-01 | Stop reason: HOSPADM

## 2018-01-01 RX ORDER — SODIUM CHLORIDE 9 MG/ML
250 INJECTION, SOLUTION INTRAVENOUS AS NEEDED
Status: DISCONTINUED | OUTPATIENT
Start: 2018-01-01 | End: 2018-10-01 | Stop reason: HOSPADM

## 2018-01-01 RX ORDER — ALBUTEROL SULFATE 2.5 MG/.5ML
2.5 SOLUTION RESPIRATORY (INHALATION)
Status: COMPLETED | OUTPATIENT
Start: 2018-01-01 | End: 2018-01-01

## 2018-01-01 RX ORDER — MIRTAZAPINE 15 MG/1
15 TABLET, FILM COATED ORAL
COMMUNITY

## 2018-01-01 RX ORDER — PHENYLEPHRINE HCL IN 0.9% NACL 30MG/250ML
10-200 PLASTIC BAG, INJECTION (ML) INTRAVENOUS
Status: DISCONTINUED | OUTPATIENT
Start: 2018-01-01 | End: 2018-10-01 | Stop reason: HOSPADM

## 2018-01-01 RX ORDER — ENOXAPARIN SODIUM 100 MG/ML
30 INJECTION SUBCUTANEOUS EVERY 24 HOURS
Status: DISCONTINUED | OUTPATIENT
Start: 2018-01-01 | End: 2018-01-01

## 2018-01-01 RX ORDER — LORAZEPAM 1 MG/1
1 TABLET ORAL
COMMUNITY

## 2018-01-01 RX ADMIN — INSULIN HUMAN 4 UNITS: 100 INJECTION, SOLUTION PARENTERAL at 07:47

## 2018-01-01 RX ADMIN — SODIUM ACETATE: 3.28 INJECTION, SOLUTION, CONCENTRATE INTRAVENOUS at 23:13

## 2018-01-01 RX ADMIN — SODIUM CHLORIDE 50 ML/HR: 900 INJECTION, SOLUTION INTRAVENOUS at 02:35

## 2018-01-01 RX ADMIN — METHYLPREDNISOLONE SODIUM SUCCINATE 125 MG: 125 INJECTION, POWDER, FOR SOLUTION INTRAMUSCULAR; INTRAVENOUS at 20:13

## 2018-01-01 RX ADMIN — ALBUTEROL SULFATE 2.5 MG: 2.5 SOLUTION RESPIRATORY (INHALATION) at 20:00

## 2018-01-01 RX ADMIN — SODIUM ACETATE: 3.28 INJECTION, SOLUTION, CONCENTRATE INTRAVENOUS at 14:52

## 2018-01-01 RX ADMIN — VASOPRESSIN 0.01 UNITS/MIN: 20 INJECTION INTRAVENOUS at 12:44

## 2018-01-01 RX ADMIN — VANCOMYCIN HYDROCHLORIDE 1000 MG: 1 INJECTION, POWDER, LYOPHILIZED, FOR SOLUTION INTRAVENOUS at 18:20

## 2018-01-01 RX ADMIN — DEXTROSE MONOHYDRATE: 5 INJECTION, SOLUTION INTRAVENOUS at 20:08

## 2018-01-01 RX ADMIN — AZITHROMYCIN MONOHYDRATE 500 MG: 500 INJECTION, POWDER, LYOPHILIZED, FOR SOLUTION INTRAVENOUS at 19:03

## 2018-01-01 RX ADMIN — SODIUM CHLORIDE 224 ML: 900 INJECTION, SOLUTION INTRAVENOUS at 18:46

## 2018-01-01 RX ADMIN — NOREPINEPHRINE BITARTRATE 2 MCG/MIN: 1 INJECTION INTRAVENOUS at 20:13

## 2018-01-01 RX ADMIN — ALBUTEROL SULFATE 2.5 MG: 2.5 SOLUTION RESPIRATORY (INHALATION) at 11:18

## 2018-01-01 RX ADMIN — INSULIN HUMAN 2 UNITS: 100 INJECTION, SOLUTION PARENTERAL at 23:18

## 2018-01-01 RX ADMIN — SODIUM ACETATE 50 MEQ: 3.28 INJECTION, SOLUTION, CONCENTRATE INTRAVENOUS at 21:27

## 2018-01-01 RX ADMIN — SODIUM CHLORIDE 1000 ML: 900 INJECTION, SOLUTION INTRAVENOUS at 19:40

## 2018-01-01 RX ADMIN — SODIUM CHLORIDE 40 MG: 9 INJECTION, SOLUTION INTRAMUSCULAR; INTRAVENOUS; SUBCUTANEOUS at 17:27

## 2018-01-01 RX ADMIN — METHYLPREDNISOLONE SODIUM SUCCINATE 60 MG: 125 INJECTION, POWDER, FOR SOLUTION INTRAMUSCULAR; INTRAVENOUS at 09:25

## 2018-01-01 RX ADMIN — NOREPINEPHRINE BITARTRATE 14 MCG/MIN: 1 INJECTION INTRAVENOUS at 07:19

## 2018-01-01 RX ADMIN — SODIUM CHLORIDE 1000 ML: 900 INJECTION, SOLUTION INTRAVENOUS at 17:00

## 2018-01-01 RX ADMIN — METHYLPREDNISOLONE SODIUM SUCCINATE 60 MG: 125 INJECTION, POWDER, FOR SOLUTION INTRAMUSCULAR; INTRAVENOUS at 23:20

## 2018-01-01 RX ADMIN — SODIUM ACETATE: 3.28 INJECTION, SOLUTION, CONCENTRATE INTRAVENOUS at 08:16

## 2018-01-01 RX ADMIN — AMIODARONE HYDROCHLORIDE 150 MG: 50 INJECTION, SOLUTION INTRAVENOUS at 09:29

## 2018-01-01 RX ADMIN — NOREPINEPHRINE BITARTRATE 20 MCG/MIN: 1 INJECTION INTRAVENOUS at 15:32

## 2018-01-01 RX ADMIN — PHYTONADIONE 10 MG: 10 INJECTION, EMULSION INTRAMUSCULAR; INTRAVENOUS; SUBCUTANEOUS at 19:17

## 2018-01-01 RX ADMIN — INSULIN HUMAN 2 UNITS: 100 INJECTION, SOLUTION PARENTERAL at 13:55

## 2018-01-01 RX ADMIN — ENOXAPARIN SODIUM 30 MG: 30 INJECTION, SOLUTION INTRAVENOUS; SUBCUTANEOUS at 23:22

## 2018-01-01 RX ADMIN — NOREPINEPHRINE BITARTRATE 16 MCG/MIN: 1 INJECTION INTRAVENOUS at 11:57

## 2018-01-01 RX ADMIN — Medication 10 ML: at 23:14

## 2018-01-01 RX ADMIN — CEFTRIAXONE SODIUM 2 G: 2 INJECTION, POWDER, FOR SOLUTION INTRAMUSCULAR; INTRAVENOUS at 18:40

## 2018-01-01 RX ADMIN — ALBUTEROL SULFATE 2.5 MG: 2.5 SOLUTION RESPIRATORY (INHALATION) at 08:39

## 2018-01-01 RX ADMIN — Medication 200 MCG/MIN: at 20:17

## 2018-01-01 RX ADMIN — SODIUM CHLORIDE 1000 ML: 900 INJECTION, SOLUTION INTRAVENOUS at 18:23

## 2018-01-01 RX ADMIN — DEXTROSE MONOHYDRATE: 5 INJECTION, SOLUTION INTRAVENOUS at 15:52

## 2018-01-01 RX ADMIN — AMIODARONE HYDROCHLORIDE 1 MG/MIN: 50 INJECTION, SOLUTION INTRAVENOUS at 09:44

## 2018-01-01 RX ADMIN — NOREPINEPHRINE BITARTRATE 20 MCG/MIN: 1 INJECTION INTRAVENOUS at 19:13

## 2018-01-01 RX ADMIN — ALBUMIN (HUMAN) 25 G: 0.25 INJECTION, SOLUTION INTRAVENOUS at 18:44

## 2018-01-01 RX ADMIN — ALBUTEROL SULFATE 2.5 MG: 2.5 SOLUTION RESPIRATORY (INHALATION) at 15:36

## 2018-01-01 RX ADMIN — SODIUM CHLORIDE 5 MG/HR: 900 INJECTION, SOLUTION INTRAVENOUS at 18:12

## 2018-01-01 RX ADMIN — Medication 100 MCG/MIN: at 18:36

## 2018-01-01 RX ADMIN — Medication 10 ML: at 13:56

## 2018-01-01 RX ADMIN — NOREPINEPHRINE BITARTRATE 10 MCG/MIN: 1 INJECTION INTRAVENOUS at 06:33

## 2018-01-01 RX ADMIN — Medication 30 MCG/MIN: at 16:28

## 2018-01-01 RX ADMIN — PIPERACILLIN SODIUM,TAZOBACTAM SODIUM 3.38 G: 3; .375 INJECTION, POWDER, FOR SOLUTION INTRAVENOUS at 18:21

## 2018-09-29 PROBLEM — E87.20 METABOLIC ACIDOSIS: Status: ACTIVE | Noted: 2018-01-01

## 2018-09-29 PROBLEM — E87.1 HYPONATREMIA: Status: ACTIVE | Noted: 2018-01-01

## 2018-09-29 PROBLEM — J96.00 ACUTE RESPIRATORY FAILURE (HCC): Status: ACTIVE | Noted: 2018-01-01

## 2018-09-29 PROBLEM — K72.00 SHOCK LIVER: Status: ACTIVE | Noted: 2018-01-01

## 2018-09-29 PROBLEM — D72.829 LEUKOCYTOSIS: Status: ACTIVE | Noted: 2018-01-01

## 2018-09-29 PROBLEM — R63.4 WEIGHT LOSS: Status: ACTIVE | Noted: 2018-01-01

## 2018-09-29 PROBLEM — A41.9 SEPTIC SHOCK (HCC): Status: ACTIVE | Noted: 2018-01-01

## 2018-09-29 PROBLEM — J43.8 OTHER EMPHYSEMA (HCC): Status: ACTIVE | Noted: 2018-01-01

## 2018-09-29 PROBLEM — N17.9 ACUTE RENAL FAILURE (ARF) (HCC): Status: ACTIVE | Noted: 2018-01-01

## 2018-09-29 PROBLEM — N30.00 ACUTE CYSTITIS WITHOUT HEMATURIA: Status: ACTIVE | Noted: 2018-01-01

## 2018-09-29 PROBLEM — J96.01 ACUTE RESPIRATORY FAILURE WITH HYPOXEMIA (HCC): Status: ACTIVE | Noted: 2018-01-01

## 2018-09-29 PROBLEM — R65.21 SEPTIC SHOCK (HCC): Status: ACTIVE | Noted: 2018-01-01

## 2018-09-29 PROBLEM — Z66 DNR (DO NOT RESUSCITATE): Status: ACTIVE | Noted: 2018-01-01

## 2018-09-29 PROBLEM — J18.9 COMMUNITY ACQUIRED PNEUMONIA OF RIGHT LOWER LOBE OF LUNG: Status: ACTIVE | Noted: 2018-01-01

## 2018-09-29 NOTE — ED PROVIDER NOTES
HPI Comments: Patient arrives from [facility]) Due to the tachycardia  To be received by cardiology. Patient states she went to that facility due to generalized weakness and chest pain. Cough is productive of yellowish sputum. She is a smoker and has chronic lung disease and is oxygen dependent. Has had progressive weight loss now for at least an extended period of weeks for several months. She has lost appetite during this time as well. States that she has lost significant amount of weight unintentionally. No known malignancy baseline. Patient is a 66 y.o. female presenting with palpitations. The history is provided by the patient. Palpitations This is a new problem. The problem is associated with an unknown factor. Associated symptoms include weakness, cough and sputum production. Pertinent negatives include no fever, no chest pressure, no irregular heartbeat, no near-syncope and no syncope. Her past medical history does not include heart disease, past MI, atrial fibrillation or drug use. No past medical history on file. No past surgical history on file. No family history on file. Social History Social History  Marital status:  Spouse name: N/A  
 Number of children: N/A  
 Years of education: N/A Occupational History  Not on file. Social History Main Topics  Smoking status: Not on file  Smokeless tobacco: Not on file  Alcohol use Not on file  Drug use: Not on file  Sexual activity: Not on file Other Topics Concern  Not on file Social History Narrative ALLERGIES: Review of patient's allergies indicates no known allergies. Review of Systems Constitutional: Negative for fever. Respiratory: Positive for cough and sputum production. Cardiovascular: Positive for palpitations. Negative for syncope and near-syncope. Neurological: Positive for weakness. Vitals: 09/29/18 1759 09/29/18 1801 09/29/18 1815 BP: 107/73 107/73 100/76 Pulse: (!) 155 (!) 150 (!) 123 Resp: (!) 34 20 SpO2:  96% 96% Weight:  40.8 kg (90 lb) Height:  5' 4\" (1.626 m) Physical Exam  
Constitutional: She appears well-developed and well-nourished. No distress. Cachetic Much older than stated age HENT:  
Head: Atraumatic. Eyes: No scleral icterus. Neck: Neck supple. Cardiovascular: Regular rhythm and intact distal pulses. Tachycardia present. Pulmonary/Chest: Effort normal. No respiratory distress. Abdominal: She exhibits no distension. There is no tenderness. There is no rebound. Musculoskeletal: Normal range of motion. She exhibits no edema or deformity. Neurological: She is alert. Skin: Skin is warm and dry. She is not diaphoretic. No erythema. Psychiatric: Her behavior is normal. Thought content normal.  
Nursing note and vitals reviewed. MDM Number of Diagnoses or Management Options Diagnosis management comments: Patient with multiple system failure and a progressive significant decline that predates more acute presenting problem. She has had unintended weight loss and has been severe over mild T month. .  She was seen at [facility] and had a moderate amount of cough and respiratory symptoms but main issue seems to be what they felt might be atrial flutter that they were unable to control. On arrival patient blood pressure systolic above 468 was maintained on diltiazem drip transitioned to a sinus rhythm but had precipitous drop her pressure her diltiazem was discontinued to hypoventilatory  And at that point was intubated emergently. Amount and/or Complexity of Data Reviewed Clinical lab tests: ordered and reviewed Tests in the radiology section of CPT®: reviewed and ordered Independent visualization of images, tracings, or specimens: yes Risk of Complications, Morbidity, and/or Mortality Presenting problems: high Diagnostic procedures: low Management options: moderate Critical Care Total time providing critical care: 30-74 minutes (=================================================================== This patient is critically ill and there is a high probability of of imminent or life threatening deterioration in the patient's condition without immediate management. The nature of the patient's clinical problem is: acute respiratory failure, lactic acidosis, as supraventricular tachycardia, severe acidosis, most likely advanced severe underlying disease with unintended traumatic recent weight loss I have spent 1 hour in direct patient care, documentation, review of labs/xrays/old records, discussion with Family, Staff, Dr Alexandru Pierce . The time involved in the performance of separately reportable procedures was not counted toward critical care time. Carlos Johansen MD; 9/29/2018 @11:01 PM 
=================================================================== 
 
) 
 
 
 
ED Course Intubation Date/Time: 9/29/2018 10:58 PM 
Performed by: Eloisa Fairchild Authorized by: Eloisa Fairchild Consent:  
  Consent obtained:  Emergent situation Risks discussed:  Aspiration and brain injury Alternatives discussed:  No treatment Pre-procedure details:  
  Patient status:  Unresponsive Procedure details: CPR in progress: no Intubation method:  Oral 
  Oral intubation technique:  Direct Laryngoscope blade: Mac 4 Number of attempts:  2 Ventilation between attempts: yes Tube visualized through cords: yes Placement assessment:  
  Breath sounds:  Equal 
  Placement verification: CXR verification CXR findings:  ETT in proper place Post-procedure details:  
  Patient tolerance of procedure: Tolerated well, no immediate complications

## 2018-09-29 NOTE — ED NOTES
Dayo Rhoades RN at bedside to attempt manual BP. Unable to obtain reading. MD is aware. Cardizem has been withheld since BP systolic decreased to <50 systolic.

## 2018-09-29 NOTE — ED TRIAGE NOTES
Per ems report patient to ed from [facility] ER after referral to Dr Christina Cedeño. Per EMS they originally took patient to [facility] earlier due to generalized weakness and fatigue. Patient has HR at 150 with cardizem drip going that was hung by staff at [facility] at a current rate of 8. Per ems they had to decrease cardizem rate from 10 to 8 due to hypotension enroute with bp in the 80. Per ems report patient had WBC count of 39 at the facility

## 2018-09-30 NOTE — PROGRESS NOTES
Ventilator check complete; patient has a #7.5 ET tube secured at the 20 at the lip. Patient is able to follow commands. Breath sounds are coarse. Trachea is midline, Negative for subcutaneous air, and chest excursion is symmetric. Patient is also Negative for cyanosis and is Negative for pitting edema. All alarms are set and audible. Resuscitation bag is at the head of the bed. Ventilator Settings Mode FIO2 Rate Tidal Volume Pressure PEEP I:E Ratio PRVC  50 %   15 400 ml     8 cm H20  1:3.4 Peak airway pressure: 30 cm H2O Minute ventilation: ABG:  
Recent Labs  
   09/30/18 
 0318  09/30/18 
 0110  09/29/18 
 2109 PH  7.07*  7.01*  6.95* PCO2  40  40  46* PO2  92  105  136* HCO3  12*  10*  10* Roberto Carlos Mendieta, RT

## 2018-09-30 NOTE — PROGRESS NOTES
Patient's blood pressure noted to be labile despite concurrent use of two vasopressors. Dr. Camille Pacheco notified, and orders received to initiate neosynephrine infusion.

## 2018-09-30 NOTE — PROGRESS NOTES
Patient's automatic and manual blood pressures unattainable. MD notified, and orders to start CVP monitoring received. CVP 18, MD notified, and orders received.

## 2018-09-30 NOTE — ED NOTES
Hailee Crews MD auscultating brachial pulse with doppler. Obtained manual pressure 60W systolic. Orders to start levo drip

## 2018-09-30 NOTE — PROGRESS NOTES
Responded to code S in unit. No family present. Prayer with patient. Will follow as needed Sebas Ghosh, staff [address], [address] [facility] [place]  /   [email or web address]

## 2018-09-30 NOTE — PROGRESS NOTES
Skin assessment done by this RN and Adriana Herron RN. Patient with scabbed wound to her right shin. Some generalized bruising. Patient with open wound to sacrum and areas of non-blanchable redness surrounding. Wound care consulted. Patient's son states that she has been spending most of her time in the chair at home watching TV and not getting up very often.

## 2018-09-30 NOTE — ED NOTES
TRANSFER - OUT REPORT: 
 
Verbal report given to DANIELLE Reid(name) on Martha García  being transferred to ICU(unit) for routine progression of care Report consisted of patients Situation, Background, Assessment and  
Recommendations(SBAR). Information from the following report(s) SBAR, ED Summary and Recent Results was reviewed with the receiving nurse. Lines:  
Peripheral IV 09/29/18 Left Forearm (Active) Peripheral IV 09/29/18 Right Antecubital (Active) Peripheral IV 09/29/18 Left Hand (Active) Opportunity for questions and clarification was provided.

## 2018-09-30 NOTE — PROGRESS NOTES
This is a 65 YO female patient transferred from [facility] yesterday. She was admitted with a diagnosis of RLL pneumonia, acute respiratory failure, septic shock. She was intubated in the ER upon arrival. She was admitted to the ICU and started on IV antibiotics. She has required IV pressors due to hypotension. She is acidotic. TOday she developed Afib with RVR and was started on IV amiodarone. Apparently she was considered too high risk for IV anticoagulation. The patient converted to NSR after receiving the initial bolus of amiodarone. This afternoon around 3:45 pm patient became weak on the left side of her body. Code stroke was called. Patient has an evident deficit on the R side of her body. Mainly the R leg. Neurology consulted. Patient will have brain ct scan done. Poor prognosis. Not think she is a candidate for TPa.

## 2018-09-30 NOTE — PROGRESS NOTES
Bedside, Verbal and Written shift change report given to Sg Singh RN and Sunday Sahu RN (oncoming nurse) by Dulce Merino RN (offgoing nurse). Report included the following information SBAR, Kardex, Intake/Output, MAR and Recent Results.

## 2018-09-30 NOTE — PROGRESS NOTES
Dr. Jeyson Landeros notified of CT results, critical lab values, and patient status. New telephone orders received at this time.

## 2018-09-30 NOTE — PROGRESS NOTES
Altered mental status noted in patient at this time. Patient follows commands, but right sided weakness noted. Hand grasps unequal and stronger on left side. Patient able to move toes on left side but not on right side. Pupils round and brisk bilaterally. Bilateral eyes conjugate and patient tracks with eyes in all directions. Right sided facial droop noted. Code stroke called, and MD at bedside.

## 2018-09-30 NOTE — PROGRESS NOTES
Patient was intubated with a number 7.5 ET Tube. Tube placement verified by auscultation, CXR and CO2 return. ET Tube is secured at the 20 cm valerie at the lip and on the bilateral side. Patient was intubated by GIANFRANCO Guerra on the 1st attempt. Breath sounds are coarse. Patient is Negative for subcutaneous air and chest excursion is symmetrical. Trachea is midline. Patient is also positive for cyanosis and is Negative for pitting edema. Patient placed on ventilator on documented settings. All alarms are set and audible. Resuscitation bag and mask are at the head of the bed. Ventilator Settings Mode FIO2 Rate Tidal Volume Pressure PEEP I:E Ratio PRVC  60 %    400 ml     8 cm H20 Peak airway pressure:    
Minute ventilation: ABG:  
Recent Labs  
   09/29/18 
 2109 PH  6.95* PCO2  46* PO2  136* HCO3  10*

## 2018-09-30 NOTE — PROGRESS NOTES
Patient assessment continues to show right sided weakness. Pupils round and brisk bilaterally, but patient's eyes unable to track to left field of vision during tele neuro consultation. Patient denies sensation to bilateral lower extremities. Tele neuro consultation in progress. Nurse at bedside

## 2018-09-30 NOTE — PROGRESS NOTES
Care Daily Progress Note: 9/30/2018 Admission Date: 9/29/2018 The patient's chart is reviewed and the patient is discussed with the staff. The patient is a 66 y.o.  female apparently known to [facility] from a [facility] Admission in the past and followed by Dr. Vish Mercedes for COPD. She presented to [place] with chest pain, SOB, purulent sputum. She was sent her for cardiology to see but appeared in acute respiratory distress on arrival with hypotension and a RLL infiltrate c/w pneumonia. She has apparently lost considerable weight this year. She was intubated in the ER by Dr. Genna Rahman.  
  
The pt has been coughing for a couple of weeks. She has been refusing to go the doctor. She has not been eating and continues to smoke 1-2 PPD. I discussed the patient's very poor prognosis with the family under these circumstances and they indicate she had not wanted to be kept alive by machines and she did not want CPR. The family desires that CPR not be performed in the event of an arrest. A DNR is established.  
  
The pt has been started on Rocephin and ZIthromax in the ER. Subjective:  
 
Awake alert and comfortable, tachypneic on ventilator due to metabolic acidosis. Current Facility-Administered Medications Medication Dose Route Frequency  amiodarone (CORDARONE) 450 mg in dextrose 5% 250 mL infusion  0.5-1 mg/min IntraVENous TITRATE  sodium chloride (NS) flush 5-10 mL  5-10 mL IntraVENous PRN  
 cefTRIAXone (ROCEPHIN) 2 g in 0.9% sodium chloride (MBP/ADV) 50 mL  2 g IntraVENous Q24H  
 azithromycin (ZITHROMAX) 500 mg in 0.9% sodium chloride (MBP/ADV) 250 mL  500 mg IntraVENous Q24H  
 NOREPINephrine (LEVOPHED) 4 mg/250 mL (16 mcg/mL) in NS infusion  2-16 mcg/min IntraVENous TITRATE  sodium chloride (NS) flush 5-10 mL  5-10 mL IntraVENous Q8H  
 sodium chloride (NS) flush 5-10 mL  5-10 mL IntraVENous PRN  
  insulin regular (NOVOLIN R, HUMULIN R) injection 0-10 Units  0-10 Units SubCUTAneous AC&HS  
 albuterol (PROVENTIL VENTOLIN) nebulizer solution 2.5 mg  2.5 mg Nebulization QID RT  
 methylPREDNISolone (PF) (SOLU-MEDROL) injection 60 mg  60 mg IntraVENous Q12H  
 enoxaparin (LOVENOX) injection 30 mg  30 mg SubCUTAneous Q24H  
 influenza vaccine 2018-19 (6 mos+)(PF) (FLUARIX QUAD/FLULAVAL QUAD) injection 0.5 mL  0.5 mL IntraMUSCular PRIOR TO DISCHARGE  sodium acetate 150 mEq in dextrose 5% 1,000 mL infusion   IntraVENous CONTINUOUS Objective:  
 
Vitals:  
 09/30/18 6287 09/30/18 6181 09/30/18 4782 09/30/18 2208 BP: (!) 81/63 (!) 89/51 91/53 Pulse: (!) 120 (!) 129 (!) 131 (!) 138 Resp: (!) 35 30 28 28 Temp:      
SpO2:      
Weight:      
Height:      
 
 
Intake and Output:  
09/28 1901 - 09/30 0700 In: 1541 [I.V.:1121] Out: 5 [Urine:5] 09/30 0701 - 09/30 1900 In: 239 [I.V.:239] Out: - Physical Exam:         
Gen:  intubated and mechanically ventilated. Cachectic HEENT:  Sclera clear, pupils equal, oral mucosa moist 
Lungs: CTA Heart:  RRR with no M,G,R; 
Abdomen:  soft with no tenderness; positive bowel sounds present Extremities:  warm with no cyanosis, tradce lower leg edema Skin:  no jaundice or ecchymosis Neuro: no gross neuro deficits Psychiatric:  alert and oriented x 3 LINES:  ETT, PIV 
 
DRIPS:  Levophed @14mcg/min CHEST XRAY:   
 
 
Ventilator Settings Mode FIO2 Rate Tidal Volume Pressure PEEP PRVC  50 %    480 ml (increased to 480 per Dr. Juanjo Parsons verbal order)     8 cm H20 Peak airway pressure: 33 cm H2O Minute ventilation: ABG:  
Recent Labs  
   09/30/18 
 0848  09/30/18 
 0318  09/30/18 
 0110 PH  7.16*  7.07*  7.01* PCO2  30*  40  40 PO2  94  92  105 HCO3  11*  12*  10* LAB Recent Labs  
   09/30/18 
 0255  09/29/18 
 1758 WBC  34.7*  28.9* HGB  12.1  12.0 HCT  41.5  39.7 PLT  150  238 Recent Labs  
   09/30/18 60 729 37 92  09/29/18 
 1758 NA  138  133* K  4.9  4.7 CL  101  95* CO2  16*  20* GLU  143*  202* BUN  46*  51* CREA  1.66*  1.66* CA  7.5*  8.1* ALB  1.7*  2.2*  
SGOT  4328*  2780* No results for input(s): LCAD, LAC in the last 72 hours. Assessment:  (Medical Decision Making) Patient Active Problem List  
Diagnosis Code  Acute respiratory failure with hypoxemia (HCC) J96.01  
 Other emphysema ([facility] J43.8  Community acquired pneumonia of right lower lobe of lung ([facility] J18.1  Septic shock (HCC) A41.9, R65.21  
 Leukocytosis D72.829  
 Acute renal failure (ARF) (HCC) N17.9  Shock liver K72.00  Acute cystitis without hematuria N30.00  Weight loss R63.4  Hyponatremia E87.1  DNR (do not resuscitate) 466 8983  Metabolic acidosis M29.0  Acute respiratory failure (HCC) J96.00 Plan:  (Medical Decision Making) Hospital Problems  Never Reviewed Codes Class Noted POA * (Principal)Acute respiratory failure with hypoxemia [facility]) ICD-10-CM: J96.01 
ICD-9-CM: 518.81  9/29/2018 Unknown With RLL PNA and metabolic acidosis, check lactate, on bicarbonate drip. Patient seems to have shock liver- continue to monitor. Other emphysema ([facility] ICD-10-CM: J43.8 ICD-9-CM: 492.8  9/29/2018 Unknown Community acquired pneumonia of right lower lobe of lung ([facility] ICD-10-CM: J18.1 ICD-9-CM: 088  9/29/2018 Unknown On rocephin and zithromax day#2/7- cultures pending Septic shock (HCC) ICD-10-CM: A41.9, R65.21 ICD-9-CM: 038.9, 785.52, 995.92  9/29/2018 Unknown Leukocytosis ICD-10-CM: O58.462 ICD-9-CM: 288.60  9/29/2018 Unknown Due to PNA Acute renal failure (ARF) (HCC) ICD-10-CM: N17.9 ICD-9-CM: 584.9  9/29/2018 Unknown IVF and maintain BP > or equal to 65mmHg Shock liver ICD-10-CM: K72.00 ICD-9-CM: 329  9/29/2018 Unknown As [place] Acute cystitis without hematuria ICD-10-CM: N30.00 ICD-9-CM: 595.0  9/29/2018 Unknown Weight loss ICD-10-CM: R63.4 ICD-9-CM: 783.21  9/29/2018 Unknown Hyponatremia ICD-10-CM: E87.1 ICD-9-CM: 276.1  9/29/2018 Unknown DNR (do not resuscitate) ICD-10-CM: X70 ICD-9-CM: V49.86  9/29/2018 Unknown Metabolic acidosis BHD-56-CL: E87.2 ICD-9-CM: 276.2  9/29/2018 Unknown Likely lactic and renal failure-   
 Acute respiratory failure ([address] ICD-10-CM: J96.00 
ICD-9-CM: 518.81  9/29/2018 Unknown Wean as she gets better Needs central venous access Continue bicarbonate and hyperventilation via ventilator Hopefully her acidemia will improve. More than 50% of time documented was spent in face-to-face contact with the patient and in the care of the patient on the floor/unit where the patient is located.   
 
Total CC time spent 30 min Dean Orantes MD

## 2018-09-30 NOTE — PROGRESS NOTES
TRANSFER - IN REPORT: 
 
Verbal report received from DANIELLE Glass on Elana Main  being received from ER for routine progression of care Report consisted of patients Situation, Background, Assessment and  
Recommendations(SBAR). Information from the following report(s) SBAR, Kardex, ED Summary, Procedure Summary, Intake/Output, MAR and Recent Results was reviewed with the receiving nurse. Opportunity for questions and clarification was provided. Assessment completed upon patients arrival to unit and care assumed.

## 2018-09-30 NOTE — PROCEDURES
Procedure:  
 
[address] Indication:  
 
Poor vascular access Summary: 
 
Informed consent was obtained from the patient/ patient's family. Using the [address] M with a vascular probe transducer and sterile seldinger technique, the [facility] vein was identified and under direct real time visualization was cannulated. The CVC kit guide wire was then inserted and its position within the subclavian vein verified in short and long axis views on vascular probe US. A quadruple lumen catheter was then placed in the right SC   vein, after dilation of entry port  without difficulty. There was no significant bleeding during the procedure and good flush and drawback was noted. The CVC was then affixed with supplied 2.0 silk sutures via the proximal and distal limiters, with the insertion point affixed at 20 cm. A biostatic disc was applied to the entry port followed by a transparent dressing. A chest x-ray  is pending. There were no immediate complications. EBL: 3 ml Lisa Thompson MD.

## 2018-09-30 NOTE — PROGRESS NOTES
Discussed case with neurologist. His differential diagnosis is CVA vs seizure activity vs metabolic/toxic encephalopathy. She is not a tpa candidate due to severe thrombocytopenia/ severe disease/ septic shock. He suggested CT scan of the head with CTA or MRI/MRA whenever possible. IV keppra and ordering an EEG. An considering LP for encephalopathy. Assessment: 
-this is most likely acute brain ischemia. She is in septic shock, acidotic and had afib this am. Prognosis is poor. Could  in the next 24hours. Plan: 
 
-ASA through NG tube. Will monitor Ptl count and active bleed  
-continue IV bicarbonate 
-IV vanco and IV zosyn. Stop ceftriaxone and zithromax  
-patient is on 2 pressors. Start hydrocortisone IV TID 
-has some blood in NGT. Monitor H&H. Start IV PPIs 
-have a very low suspicion for seizures. Will not order keppra. EEG ordered  
-The only test which can be safely done at this time is a ct scan of the head. Will defer rest of tests 
-poor prognosis. If her clinical trend does not change, could  in the next 24h

## 2018-09-30 NOTE — H&P
HISTORY AND PHYSICAL [place] 9/29/2018 Date of Admission:  9/29/2018 The patient's chart is reviewed and the patient is discussed with the staff. Subjective: The patient is a 66 y.o.  female apparently known to [facility] from a [facility] Admission in the past and followed by Dr. Brock Trujillo for COPD. She presented to [facility] with chest pain, SOB, purulent sputum. She was sent her for cardiology to see but appeared in acute respiratory distress on arrival with hypotension and a RLL infiltrate c/w pneumonia. She has apparently lost considerable weight this year. She was intubated in the ER by Dr. Meeta Michelle. The pt has been coughing for a couple of weeks. She has been refusing to go the doctor. She has not been eating and continues to smoke 1-2 PPD. I discussed the patient's very poor prognosis with the family under these circumstances and they indicate she had not wanted to be kept alive by machines and she did not want CPR. The family desires that CPR not be performed in the event of an arrest. A DNR is established. The pt has been started on Rocephin and ZIthromax in the ER. Review of Systems Review of systems not obtained due to patient factors. Patient Active Problem List  
Diagnosis Code  Acute respiratory failure with hypoxemia ([facility]) J96.01  
 Other emphysema ([facility] J43.8  Community acquired pneumonia of right lower lobe of lung ([facility] J18.1  Septic shock ([facility]) A41.9, R65.21  
 Leukocytosis D72.829  
 Acute renal failure (ARF) ([facility]) N17.9  Shock liver K72.00  Acute cystitis without hematuria N30.00  Weight loss R63.4  Hyponatremia E87.1  DNR (do not resuscitate) [id number]  Metabolic acidosis C68.3 Prior to Admission Medications Prescriptions Last Dose Informant Patient Reported?  Taking?  
budesonide (PULMICORT) 0.5 mg/2 mL nbsp   No No  
Sig: USE ONE VIAL PER NUBLIZER TWICE A DAY  
 budesonide (PULMICORT) 0.5 mg/2 mL nbsp   No No  
Si mL by Nebulization route two (2) times a day. Dx COPD J44.9 Facility-Administered Medications: None No past medical history on file. No past surgical history on file. Social History Social History  Marital status:  Spouse name: N/A  
 Number of children: N/A  
 Years of education: N/A Occupational History  Not on file. Social History Main Topics  Smoking status: Not on file  Smokeless tobacco: Not on file  Alcohol use Not on file  Drug use: Not on file  Sexual activity: Not on file Other Topics Concern  Not on file Social History Narrative No family history on file. No Known Allergies Current Facility-Administered Medications Medication Dose Route Frequency  dilTIAZem (CARDIZEM) 100 mg in 0.9% sodium chloride (MBP/ADV) 100 mL infusion  0-15 mg/hr IntraVENous TITRATE  sodium chloride (NS) flush 5-10 mL  5-10 mL IntraVENous PRN  
 cefTRIAXone (ROCEPHIN) 2 g in 0.9% sodium chloride (MBP/ADV) 50 mL  2 g IntraVENous Q24H  
 azithromycin (ZITHROMAX) 500 mg in 0.9% sodium chloride (MBP/ADV) 250 mL  500 mg IntraVENous Q24H  
 NOREPINephrine (LEVOPHED) 4 mg/250 mL (16 mcg/mL) in NS infusion  2-16 mcg/min IntraVENous TITRATE Current Outpatient Prescriptions Medication Sig  budesonide (PULMICORT) 0.5 mg/2 mL nbsp USE ONE VIAL PER NUBLIZER TWICE A DAY  budesonide (PULMICORT) 0.5 mg/2 mL nbsp 2 mL by Nebulization route two (2) times a day. Dx COPD J44.9 Objective:  
 
Vitals:  
 18 BP:   (!) 75/50 (!) 66/45 Pulse: 91 85 83 81 Resp: 19 26  28 Temp:      
SpO2: (!) 80% (!) 75% Weight:      
Height: PHYSICAL EXAM  
 
Constitutional:  the patient is emaciated and in no acute distress on the ventilator EENMT:  Sclera clear, pupils equal, oral mucosa moist; partially edentulous Respiratory: rhonchi on R Cardiovascular:  RRR without M,G,R 
Gastrointestinal: soft and non-tender; with positive bowel sounds. Musculoskeletal: warm without cyanosis. There is+1  lower leg edema. Skin:  no jaundice or rashes, Neurologic: no gross neuro deficits Psychiatric:  sedated CXR: 
 
 
 
 
Recent Labs  
   09/29/18 
 1758 WBC  28.9* HGB  12.0 HCT  39.7 PLT  238 Recent Labs  
   09/29/18 
 1758 NA  133* K  4.7 CL  95* GLU  202* CO2  20* BUN  51* CREA  1.66* CA  8.1* ALB  2.2* TBILI  1.1 ALT  856* SGOT  2780* No results for input(s): PH, PCO2, PO2, HCO3 in the last 72 hours. No results for input(s): LCAD, LAC in the last 72 hours. Assessment:  (Medical Decision Making) Hospital Problems  Never Reviewed Codes Class Noted POA Acute respiratory failure with hypoxemia [facility]) ICD-10-CM: J96.01 
ICD-9-CM: 518.81  9/29/2018 Unknown Now intubated and in septic shock. Prognosis is very poor. May have underlying malignancy. Other emphysema ([facility] ICD-10-CM: J43.8 ICD-9-CM: 492.8  9/29/2018 Unknown Evident by CXR. Community acquired pneumonia of right lower lobe of lung ([facility] ICD-10-CM: J18.1 ICD-9-CM: 201  9/29/2018 Unknown RLL with considerable sputum; cx obtained Septic shock (HCC) ICD-10-CM: A41.9, R65.21 ICD-9-CM: 038.9, 785.52, 995.92  9/29/2018 Unknown Getting fluids. Will need CVL for pressors Leukocytosis ICD-10-CM: Q74.115 ICD-9-CM: 288.60  9/29/2018 Unknown Acute renal failure (ARF) (HCC) ICD-10-CM: N17.9 ICD-9-CM: 584.9  9/29/2018 Unknown Monitor UOP and labs Shock liver ICD-10-CM: K72.00 ICD-9-CM: 751  9/29/2018 Unknown Likely from hypotension. Could have liver mets Acute cystitis without hematuria ICD-10-CM: N30.00 ICD-9-CM: 595.0  9/29/2018 Unknown Weight loss ICD-10-CM: R63.4 ICD-9-CM: 783.21  9/29/2018 Unknown Severe. May be due to severe COPD, cancer, or both Hyponatremia ICD-10-CM: E87.1 ICD-9-CM: 276.1  9/29/2018 Unknown DNR (do not resuscitate) ICD-10-CM: J30 ICD-9-CM: V49.86  9/29/2018 Unknown Metabolic acidosis IKW-89-UI: E87.2 ICD-9-CM: 276.2  9/29/2018 Unknown Anion gap 18. Plan:  (Medical Decision Making) --Will admit to ICU for further medical management 
--Continue vent support 
--culture 
--steroid therapy 
--antibiotic therapy with Rocephin and Zithromax to continue 
--DNR per family wishes --Volume expansion 
--Pressors as needed; CVL placement. --Follow serial labs, ABGs, CXR. 
--Chest CT if ever stable to assess for possible malignancy. --Follow cx. 
--PC consult if pt survives the weekend. Critically ill with poor prognosis. More than 50% of the time documented was spent in face-to-face contact with the patient and in the care of the patient on the floor/unit where the patient is located. Rain Snow MD 
 
 
ADDENDUM Pt's son now wants to reverse DNR status.  
 
Rain Snow MD

## 2018-09-30 NOTE — ED NOTES
RT and Maria Eugenia Mullen MD at bedside evaluating patient.  Pt to be intubated without sedatives due to low BP

## 2018-09-30 NOTE — PROGRESS NOTES
Dr. Rowley [place] notified of CT of head, most recent critical lab results and patient status and hemodynamics despite 3 pressors.

## 2018-09-30 NOTE — PROGRESS NOTES
Visited with patient during Code SSathish Adams, staff [address], [id number] [facility]  /   [email or web address]

## 2018-10-01 ENCOUNTER — APPOINTMENT (OUTPATIENT)
Dept: GENERAL RADIOLOGY | Age: 78
DRG: 871 | End: 2018-10-01
Attending: INTERNAL MEDICINE
Payer: MEDICARE

## 2018-10-01 NOTE — PROGRESS NOTES
Callback to extend end of life care to pt, family & staff, after ely Fajardo's shift ended. Ministry of presence & prayer to demonstrate care & convey emotional and spiritual support.  
Chaplain Ciara Lester MDiv,ThM,PhD

## 2018-10-01 NOTE — DISCHARGE SUMMARY
DISCHARGE NOTE Sloane Abdi Admission date:  2018 Discharge date:  2018 Admitting Diagnosis:  Acute respiratory failure ([facility] [address] Discharge Diagnoses:   
Hospital Problems  Never Reviewed Codes Class Noted POA * (Principal)Acute respiratory failure with hypoxemia [facility]) ICD-10-CM: J96.01 
ICD-9-CM: 518.81  2018 Unknown Other emphysema ([facility] ICD-10-CM: J43.8 ICD-9-CM: 492.8  2018 Unknown Community acquired pneumonia [facility] right lower [facility] ICD-10-CM: J18.1 ICD-9-CM: 058  2018 Unknown Cultures incomplete at time of death Septic shock (HCC) ICD-10-CM: A41.9, R65.21 ICD-9-CM: 038.9, 785.52, 995.92  2018 Unknown Refractory to treatment Leukocytosis ICD-10-CM: X22.881 ICD-9-CM: 288.60  2018 Unknown Acute renal failure (ARF) (HCC) ICD-10-CM: N17.9 ICD-9-CM: 584.9  2018 Unknown Shock liver ICD-10-CM: K72.00 ICD-9-CM: 513  2018 Unknown Acute cystitis without hematuria ICD-10-CM: N30.00 ICD-9-CM: 595.0  2018 Unknown Weight loss ICD-10-CM: R63.4 ICD-9-CM: 783.21  2018 Unknown Hyponatremia ICD-10-CM: E87.1 ICD-9-CM: 276.1  2018 Unknown DNR (do not resuscitate) ICD-10-CM: O06 ICD-9-CM: V49.86  2018 Unknown Metabolic acidosis UWQ-84-EV: E87.2 ICD-9-CM: 276.2  2018 Unknown Acute respiratory failure ([facility] [address] ICD-10-CM: J96.00 
ICD-9-CM: 518.81  2018 Unknown Consultants: None Studies/Procedures: Intubation, Mechanical ventilation, CVL placement Condition on Discharge:   Disposition:  Josefina Heath Presenting Illness: The patient is a 66 y.o.  female apparently known to [facility] from a [facility] in the past and followed by Dr. Eduardo Alamo for COPD. She presented to [place] with chest pain, SOB, purulent sputum. She was sent her for cardiology to see but appeared in acute respiratory distress on arrival with hypotension and a RLL infiltrate c/w pneumonia. She has apparently lost considerable weight this year. She was intubated in the ER by Dr. Ben Graff.  
  
The pt has been coughing for a couple of weeks. She has been refusing to go the doctor. She has not been eating and continues to smoke 1-2 PPD. I discussed the patient's very poor prognosis with the family under these circumstances and they indicate she had not wanted to be kept alive by machines and she did not want CPR. The family desires that CPR not be performed in the event of an arrest. A DNR is established.  
  
The pt has been started on Rocephin and ZIthromax in the ER. Hospital course: The pt was admitted to the ICU and was maintained on mechanical ventilation, pressors, and acetate for a marked metabolic acidosis. A DNR was requested by the family. She has a CVL placed. Despite aggressive therapy, she did not improve and a refractory shock state developed. She gradually became more hypotensive and passed away at 10:01 PM. Her family was present at the time of her death. LAB Recent Labs  
   18 [phone]  18 
 0255  18 
 1758 WBC  37.2*  34.7*  28.9* HGB  9.1*  12.1  12.0 HCT  32.4*  41.5  39.7 PLT  42*  150  238 INR  10.5*   --    --   
 
Recent Labs  
   18 063 86 46 67  18 
 0255  18 
 1758 NA  137  138  133*  
K  5.4*  4.9  4.7 CL  94*  101  95* CO2  15*  16*  20* BUN  45*  46*  51* CREA  2.16*  1.66*  1.66* CA  6.1*  7.5*  8.1*  
TROIQ  1.93*   --    --   
ALB   --   1.7*  2.2* TBILI   --   1.4*  1.1 ALT   --   1046*  856* SGOT   --   4328*  2780* Recent Labs  
   18 [phone]) [phone]  18   18 
 0848 PH  7.03*  7.07*  7.16* PCO2  38  34*  30* PO2  111*  136*  94 HCO3  10*  10*  11* Condition on Discharge:   Cause of Death:  
 
 CAP complicated by septic shock.   
 
 
Noemy Graff MD

## 2018-10-01 NOTE — PROGRESS NOTES
Patient's son notified that she is not doing well and he should return to the hospital. States that he is on his way.

## 2018-10-01 NOTE — PROGRESS NOTES
Ventilator check complete; patient has a #7.5 ET tube secured at the 20 at the lip. Patient is not sedated. Patient is able to follow commands. Breath sounds are coarse. Trachea is midline, Negative for subcutaneous air, and chest excursion is symmetrical. Patient is also Negative for pitting edema. All alarms are set and audible. Resuscitation bag is at the head of the bed. Ventilator Settings Mode FIO2 Rate Tidal Volume Pressure PEEP I:E Ratio PRVC  50 %  24  480 ml     8 cm H20  1:2.1 Peak airway pressure: 32 cm H2O Minute ventilation: 10.3 l/min ABG:  
Recent Labs  
   09/30/18 [phone]) [phone]  09/30/18   09/30/18 
 0848 PH  7.03*  7.07*  7.16* PCO2  38  34*  30* PO2  111*  136*  94 HCO3  10*  10*  11* [facility]

## 2018-10-01 NOTE — PROGRESS NOTES
Comforted patient with presence, prayer and gospel music Patient seemed comforted by sons, \"blessed assurance\" and \" it is well with my soul\" Told patient that chaplain Aide Limon will be coming by to see her later tonight.  
 
Hillary Malone, staff [address], [address] [facility]  /   [email or web address]

## 2018-10-01 NOTE — PROGRESS NOTES
Pt with progressive deterioration. She loss pulse and respirations and is pronounced dead at 10:01 PM. Kaiser Simpson MD 
 
 Declined

## 2018-10-04 LAB
BACTERIA SPEC CULT: ABNORMAL
BACTERIA SPEC CULT: NORMAL
BACTERIA SPEC CULT: NORMAL
GRAM STN SPEC: ABNORMAL
SERVICE CMNT-IMP: ABNORMAL
SERVICE CMNT-IMP: NORMAL
SERVICE CMNT-IMP: NORMAL